# Patient Record
Sex: FEMALE | Race: WHITE | NOT HISPANIC OR LATINO | Employment: OTHER | URBAN - METROPOLITAN AREA
[De-identification: names, ages, dates, MRNs, and addresses within clinical notes are randomized per-mention and may not be internally consistent; named-entity substitution may affect disease eponyms.]

---

## 2023-06-18 LAB — URINE CULTURE: NORMAL

## 2023-08-14 ENCOUNTER — HOSPITAL ENCOUNTER (OUTPATIENT)
Dept: DATA CONVERSION | Facility: HOSPITAL | Age: 68
Discharge: HOME | End: 2023-08-14

## 2023-08-14 DIAGNOSIS — N81.4 UTEROVAGINAL PROLAPSE, UNSPECIFIED: ICD-10-CM

## 2023-08-14 DIAGNOSIS — N39.3 STRESS INCONTINENCE (FEMALE) (MALE): ICD-10-CM

## 2023-08-14 DIAGNOSIS — N80.03 ADENOMYOSIS OF THE UTERUS: ICD-10-CM

## 2023-08-14 DIAGNOSIS — N81.3 COMPLETE UTEROVAGINAL PROLAPSE: ICD-10-CM

## 2023-08-14 LAB — ABO + RH BLD: NORMAL

## 2023-11-17 ENCOUNTER — OFFICE VISIT (OUTPATIENT)
Dept: OBSTETRICS AND GYNECOLOGY | Facility: CLINIC | Age: 68
End: 2023-11-17
Payer: MEDICARE

## 2023-11-17 VITALS
DIASTOLIC BLOOD PRESSURE: 83 MMHG | SYSTOLIC BLOOD PRESSURE: 152 MMHG | HEIGHT: 64 IN | BODY MASS INDEX: 23.12 KG/M2 | HEART RATE: 118 BPM | WEIGHT: 135.4 LBS

## 2023-11-17 DIAGNOSIS — R33.9 URINARY RETENTION: ICD-10-CM

## 2023-11-17 DIAGNOSIS — N39.3 SUI (STRESS URINARY INCONTINENCE, FEMALE): Primary | ICD-10-CM

## 2023-11-17 DIAGNOSIS — R39.9 UTI SYMPTOMS: ICD-10-CM

## 2023-11-17 LAB
POC APPEARANCE, URINE: CLEAR
POC BILIRUBIN, URINE: NEGATIVE
POC BLOOD, URINE: ABNORMAL
POC COLOR, URINE: YELLOW
POC GLUCOSE, URINE: NEGATIVE MG/DL
POC KETONES, URINE: NEGATIVE MG/DL
POC LEUKOCYTES, URINE: ABNORMAL
POC NITRITE,URINE: NEGATIVE
POC PH, URINE: 6 PH
POC PROTEIN, URINE: NEGATIVE MG/DL
POC SPECIFIC GRAVITY, URINE: 1.01
POC UROBILINOGEN, URINE: 0.2 EU/DL

## 2023-11-17 PROCEDURE — 81003 URINALYSIS AUTO W/O SCOPE: CPT | Performed by: OBSTETRICS & GYNECOLOGY

## 2023-11-17 PROCEDURE — 51798 US URINE CAPACITY MEASURE: CPT | Mod: PN | Performed by: OBSTETRICS & GYNECOLOGY

## 2023-11-17 PROCEDURE — 99214 OFFICE O/P EST MOD 30 MIN: CPT | Mod: PO | Performed by: OBSTETRICS & GYNECOLOGY

## 2023-11-17 PROCEDURE — 1036F TOBACCO NON-USER: CPT | Performed by: OBSTETRICS & GYNECOLOGY

## 2023-11-17 PROCEDURE — 1126F AMNT PAIN NOTED NONE PRSNT: CPT | Performed by: OBSTETRICS & GYNECOLOGY

## 2023-11-17 PROCEDURE — 51798 US URINE CAPACITY MEASURE: CPT | Performed by: OBSTETRICS & GYNECOLOGY

## 2023-11-17 PROCEDURE — 99214 OFFICE O/P EST MOD 30 MIN: CPT | Performed by: OBSTETRICS & GYNECOLOGY

## 2023-11-17 ASSESSMENT — ENCOUNTER SYMPTOMS
CONSTITUTIONAL NEGATIVE: 1
NEUROLOGICAL NEGATIVE: 1
EYES NEGATIVE: 1
CARDIOVASCULAR NEGATIVE: 1
ENDOCRINE NEGATIVE: 1
PSYCHIATRIC NEGATIVE: 1
MUSCULOSKELETAL NEGATIVE: 1
RESPIRATORY NEGATIVE: 1
GASTROINTESTINAL NEGATIVE: 1

## 2023-11-17 ASSESSMENT — PAIN SCALES - GENERAL: PAINLEVEL: 0-NO PAIN

## 2023-11-17 NOTE — PROGRESS NOTES
[facility] Department of Urogynecology   Safia Lynch MD, MPH   [phone]    ASSESSMENT AND PLAN:   68 year old female presenting in postop s/p SSLS, APR, TVH, midurethral sling and cystoscopy on 8/14/2023 @ [facility] for a stage 3 uterovaginal prolapse with predominant cystocele. Endorsing occult SUNITA since she declined TVT at the time of her POP repair and opted for a staged procedure if needed. Not a good candidate for TVT as she valsalva voids at baseline and has urinary retention on UDS and per her PVR in clinic.      Diagnoses:   #1 Stress urinary incontinence   #2 Uterovaginal prolapse with predominant cystocele, stage 3  #3 Urinary retention      Plan:   1. Uterovaginal prolapse, postop  - 8/14/2023 surgery: SSLS, KS, TVH, and cystoscopy.   - Resolved POP and she is well-healed from surgery.   - Discussed that it can take 3-6 months to dissolve and fall out. We described the material of the suture to expect what it will look and feel like when it falls out so she will not be alarmed by this as it is completely normal and to be expected.     2. SUNITA, non-obstructive urinary retention  - SUNITA not bothersome  - PVR = 360mL by bladder U/S. She states she was nervous today and may not have completely emptied do to that  - Reviewed her prior UDS from 8/14/2023 which demonstrated normal capacity and compliance, normal sensation. + SUNITA at 450, no DO on this exam. MUCP 39. Pressure-flow: unable to void with catheter in place- expelled during attempt to void. Could only void 17 cc and straight cath PVR was 550. no evidence of MAYFIELD, but unable to empty, with slow flow.   - Patient is not a good candidate for TVT as she valsalva voids at baseline and has urinary retention on UDS.   - She continues to endorse mild SUNITA with running > coughing/laughing; goes through around 1 pad per day for this. - The patient does not wish to pursue any kind of SUNITA treatment at this time as she is not severely bothered by rare  SUNITA and will plan to continue wearing pads when running for SUNITA related leakage given that any third-line options (TVT vs. Bulkamid) put her at greater risk for developing worsening urinary retention.   - No recent UTI and she ultimately denies feeling of incomplete bladder emptying at baseline; she states she feels nervous when she is in the clinic and this could be affecting her ability to void completely.   - If she continues to have persistent retention issues we discussed that this can negatively impact her kidney function and we may consider discussing ISC or SNM to address this. Plan to check her kidney function annually with BMP (creatinine) labs due to her having non-obstructive urinary retention.   - Cr 0.6 in August 2023    3. Vaginal atrophy, history of UTI  - Advised her to call our office and leave a urine sample at any  lab when she feels symptomatic of a UTI in the future. Placed standing urine culture orders for this.   - Discussed that utilizing tv estrogen acts as UTI prophylaxis in postmenopausal women by making the vaginal pH more acidic by therefore preventing bacteria from colonizing.   - Encouraged her to restart using tv Estradiol cream 2x/week for UTI ppx.     Follow up in 9 months with Dr. Safia Lynch for a yearly pelvic exam and PVR check.     Scribe Attestation  By signing my name below, I, Dell Carmichael, Scribe, attest that this documentation has been prepared under the direction and in the presence of Safia Lynch MD MPH.    Problem List Items Addressed This Visit    None  Visit Diagnoses       UTI symptoms    -  Primary    Relevant Orders    Urinalysis with Reflex Microscopic and Culture    SUNITA (stress urinary incontinence, female)        Relevant Orders    POCT UA Automated manually resulted    Measure post void residual           I spent a total of 20 minutes in face to face and non face to face time.      Safia Lynch MD, MPH, FACOG     I Dr. Lynch, personally performed  the services described in the documentation as scribed in my presence and confirm it is both complete and accurate.  Safia Lynch MD, MPH, FACOG      Established    HISTORY OF PRESENT ILLNESS:   67yo female presenting in follow up for persistent SUNITA.     Records Review:   - 9/22/2023 Dr. Lynch note reviewed: Postoperative visit s/p SSLS, OH, and TVH on 8/14/2023 for uterovaginal prolapse - she had POUR and passed her voiding trial POD #2. Benign surgical pathology. She is healing well and released from lifting restrictions/pelvic rest with resolved POP. Occult SUNITA - declined doing TVT at the time of her POP repair and endorses occult SUNITA. Declines being fitted with anti-incontinnece pessary and she plans to wear pads when she is physically active and may leak with SUNITA while she is away in [place]. She is not a good candidate for TVT because she valsalva voids at baseline. Gave her PI handout on Bulkamid to review and consider.   - 9/1/2023 Reviewed Dr. Lynch's note: Postoperative visit - Her postoperative recovery was complicated by urinary retention, but passed void trial on postop day 1. No POP on exam. SUNITA - +CST upon standing. Patient opted for a staged SUNITA procedure if needed (declined TVT at the time of her POP repair). Planning a trip to [place] on 10/2/2023 and would like to try a pessary before that. Gh = 3.   - 8/14/2023 surgery: SSLS, OH, TVH, and cystoscopy for uterovaginal prolapse.  - UDS 7/19/2023   CMG: normal capacity and compliance, normal sensation. + SUNITA at 450, no DO on this exam. MUCP 39  pressure-flow: unable to void with catheter in place- expelled during attempt to void. Could only void 17 cc and straight cath PVR was 550. no evidence of MAYFIELD, but unable to empty, with slow flow  a/p: 7 yo with uterovaginal prolapse planning TVH, SSLS, APR and cysto in August 2023. sx of MARIAN at baseline with mildly elevated PVR in clinic of 161  > elevated PVR on UDS today. will discuss mechanism of  voiding. given that she couldn't void during UDS, will consider staged procedure with possible re-evaluation and repair of incontinence after prolapse resolved.  > resolving retention may resolve her mixed incontinence and recurrent UTI  > can consider SNM in the future but would proceed with prolapse repair first.     Prolapse Symptoms:   - Resolved POP since her surgery.   - Not currently sexually active.   - She discontinued using tv estrogen cream.      Urinary Symptoms:   - She continues to endorse SUNITA with running, coughing, and laughing.   - The patient is not very bothered by SUNITA and wears around 1 pad per day.   - She does not wish to pursue any kind of SUNITA treatment at this time as she is not severely bothered by rare SUNITA and will plan to continue wearing pads when running for SUNITA related leakage given that any third-line options (TVT vs. Bulkamid) put her at greater risk for developing worsening urinary retention.   - Denies feeling of incomplete bladder emptying at baseline; she states she feels nervous when she is in the clinic and this could be affecting her ability to void completely.   - No recent UTI and does not feel symptomatic of a UTI today.      Past Surgical History:     Past Surgical History:   Procedure Laterality Date    HYSTERECTOMY  08/14/2023    SSLS, Posterior repair         Medications:     Prior to Admission medications    Medication Sig Start Date End Date Taking? Authorizing Provider   ibuprofen 600 mg tablet TAKE 1 TABLET BY MOUTH EVERY 6 HOURS AS NEEDED FOR PAIN 8/14/23 8/13/24  Safia Lynch MD MPH   oxyCODONE (Roxicodone) 5 mg immediate release tablet TAKE 1 TABLET BY MOUTH EVERY 6 HOURS AS NEEDED FOR PAIN 8/14/23 2/10/24  Safia Lynch MD MPH   polyethylene glycol (Glycolax, Miralax) 17 gram/dose powder MIX 17 GRAMS INTO 4-8 OUNCES OF WATER OR JUICE AND TAKE BY MOUTH TWO TIMES A DAY 8/14/23 8/13/24  Safia Lynch MD MPH        ROS  Review of Systems   Constitutional:  "Negative.    HENT: Negative.     Eyes: Negative.    Respiratory: Negative.     Cardiovascular: Negative.    Gastrointestinal: Negative.    Endocrine: Negative.    Genitourinary:  Positive for enuresis.   Musculoskeletal: Negative.    Neurological: Negative.    Psychiatric/Behavioral: Negative.          PHYSICAL EXAM:    /83 (BP Location: Left arm, Patient Position: Sitting)   Pulse (!) 118   Ht 1.626 m (5' 4\")   Wt 61.4 kg (135 lb 6.4 oz)   BMI 23.24 kg/m²     PVR = >300mL by bladder U/S.     Declines chaperone for physical exam.    Well developed, well nourished, in no apparent distress.   Neurologic/Psychiatric:  Awake, Alert and Oriented times 3.  Affect normal.     GENITAL/URINARY:     External Genitalia:  The patient has normal appearing external genitalia, normal skenes and bartholins glands, and a normal hair distribution.  Her vulva is without lesions, erythema or discharge.  It is non-tender with appropriate sensation.     Urethral Meatus:  Size normal, Location normal, Lesions absent, Prolapse absent,      Urethra:  Fullness absent, Masses absent,      Bladder:  Fullness absent, Masses absent, Tenderness absent,      Vagina:  General appearance normal, Estrogen effect normal, Discharge absent, Lesions absent. Sutures are intact at the sacrospinous ligament. Cuff is intact.     Cervix: surgically absent.   Uterus:  surgically absent    Anus/Perineum: Normal Perineum    Stress urinary incontinence not demonstrable.       Physical Exam  Genitourinary:   POP-Q measurements were:      Aa: -2, Ba: -2, C: -9     gH: 4, pB: 7, TVL: 9     Ap: -2, Bp: -2, D: X     Pelvic exam was performed with patient in the lithotomy position.       She does not have myofascial tenderness on exam.     Rectal exam: Deferred.       Data and DIAGNOSTIC STUDIES REVIEWED   No results found.   Lab Results   Component Value Date    URINECULTURE NO SIGNIFICANT GROWTH. 06/16/2023      Lab Results   Component Value Date    GLUCOSE " 110 (H) 08/07/2023    CALCIUM 9.4 08/07/2023     08/07/2023    K 4.9 08/07/2023    CO2 25 08/07/2023    CL 99 08/07/2023    BUN 11 08/07/2023    CREATININE 0.6 08/07/2023     Lab Results   Component Value Date    WBC 5.9 08/07/2023    HGB 14.6 08/07/2023    HCT 44.6 (H) 08/07/2023    MCV 97.2 08/07/2023     08/07/2023

## 2023-12-15 ENCOUNTER — APPOINTMENT (OUTPATIENT)
Dept: OBSTETRICS AND GYNECOLOGY | Facility: CLINIC | Age: 68
End: 2023-12-15
Payer: MEDICARE

## 2024-07-26 ENCOUNTER — APPOINTMENT (OUTPATIENT)
Dept: OBSTETRICS AND GYNECOLOGY | Facility: CLINIC | Age: 69
End: 2024-07-26
Payer: MEDICARE

## 2024-08-16 ENCOUNTER — APPOINTMENT (OUTPATIENT)
Dept: OBSTETRICS AND GYNECOLOGY | Facility: CLINIC | Age: 69
End: 2024-08-16
Payer: MEDICARE

## 2024-08-22 ENCOUNTER — APPOINTMENT (OUTPATIENT)
Dept: OBSTETRICS AND GYNECOLOGY | Facility: CLINIC | Age: 69
End: 2024-08-22
Payer: MEDICARE

## 2024-09-11 ENCOUNTER — APPOINTMENT (OUTPATIENT)
Dept: OBSTETRICS AND GYNECOLOGY | Facility: CLINIC | Age: 69
End: 2024-09-11
Payer: MEDICARE

## 2024-10-18 ENCOUNTER — APPOINTMENT (OUTPATIENT)
Dept: OBSTETRICS AND GYNECOLOGY | Facility: CLINIC | Age: 69
End: 2024-10-18
Payer: MEDICARE

## 2024-11-13 ENCOUNTER — APPOINTMENT (OUTPATIENT)
Dept: OBSTETRICS AND GYNECOLOGY | Facility: CLINIC | Age: 69
End: 2024-11-13
Payer: MEDICARE

## 2024-11-13 VITALS
WEIGHT: 139.8 LBS | SYSTOLIC BLOOD PRESSURE: 132 MMHG | BODY MASS INDEX: 24 KG/M2 | DIASTOLIC BLOOD PRESSURE: 80 MMHG | HEART RATE: 76 BPM

## 2024-11-13 DIAGNOSIS — Z90.710 HISTORY OF HYSTERECTOMY: ICD-10-CM

## 2024-11-13 DIAGNOSIS — N39.3 SUI (STRESS URINARY INCONTINENCE, FEMALE): ICD-10-CM

## 2024-11-13 DIAGNOSIS — R33.9 URINARY RETENTION: Primary | ICD-10-CM

## 2024-11-13 PROCEDURE — 99214 OFFICE O/P EST MOD 30 MIN: CPT | Performed by: OBSTETRICS & GYNECOLOGY

## 2024-11-13 PROCEDURE — 1159F MED LIST DOCD IN RCRD: CPT | Performed by: OBSTETRICS & GYNECOLOGY

## 2024-11-13 RX ORDER — MINERAL OIL
180 ENEMA (ML) RECTAL
COMMUNITY

## 2024-11-13 ASSESSMENT — ENCOUNTER SYMPTOMS
EYES NEGATIVE: 1
CONSTITUTIONAL NEGATIVE: 1
NEUROLOGICAL NEGATIVE: 1
CARDIOVASCULAR NEGATIVE: 1
ENDOCRINE NEGATIVE: 1
RESPIRATORY NEGATIVE: 1
MUSCULOSKELETAL NEGATIVE: 1
GASTROINTESTINAL NEGATIVE: 1
PSYCHIATRIC NEGATIVE: 1

## 2024-11-13 NOTE — PROGRESS NOTES
[facility] Department of Urogynecology   Safia Lynch MD, MPH   [phone]    ASSESSMENT AND PLAN:   69 year old female with a hx of urinary retention. She is s/p SSLS, APR, TVH, midurethral sling and cystoscopy on 8/14/2023 for a stage 3 uterovaginal prolapse with predominant cystocele. Endorsing occult SUNITA since she declined TVT at the time of her POP repair and opted for a staged procedure if needed. Of note, she is not a good candidate for TVT as she valsalva voids at baseline and has urinary retention on UDS and per her PVR in clinic.      Diagnoses:   #1 Stress urinary incontinence (resolved)  #2 Uterovaginal prolapse with predominant cystocele, stage 3 (resolved)   #3 Urinary retention      Plan:   1. Uterovaginal prolapse  - 8/14/2023 surgery: SSLS, APR, TVH, and cystoscopy.   - No evidence of recurrent POP per patient symptoms or based on exam findings.     2. Urinary retention  - Patient states that her urinary retention that we notice in clinic with elevated PVR is solely due to her nervousness. She reports is able to empty her bladder normally at home without the feeling of incomplete bladder emptying or sensation of urinary retention.   - Sent order for labs to include BMP to evaluate her kidney function.   - RTC 1 year or sooner if symptoms of retention worsen    3. SUNITA  - Her SUNITA resolved around x6 months after her POP repair surgery in 2023. She is now able to walk and jog without experiencing SUNITA leakage.     Follow up in 1 year with Dr. Safia Lynch to get a PVR and annual BMP to ensure her kidney function is normal due to having urinary retention issues.     Scribe Attestation  By signing my name below, Dell PEMBERTON Scribe, attest that this documentation has been prepared under the direction and in the presence of Safia Lynch MD MPH on 11/13/2024 at 12:23 PM.     Problem List Items Addressed This Visit    None  Visit Diagnoses       Urinary retention    -  Primary     Relevant Orders    Basic metabolic panel    History of hysterectomy               I spent a total of 30 minutes in face to face and non face to face time.     I Dr. Lynch, personally performed the services described in the documentation as scribed in my presence and confirm it is both complete and accurate.  Safai Lynch MD, MPH, FACOG       Safia Lynch MD, MPH, FACOG     Established    HISTORY OF PRESENT ILLNESS:   69 year old female presenting for an annual postoperative follow up after POP repair in 2023 and for SUNITA.     Record Review:   -11/17/2023 Dr. Safia Lynch note RE: Hx of uterovaginal prolapse - Postoperative visit s/p SSLS, SD, and TVH on 8/14/2023 with resolved preoperative POP symptoms. SUNITA - Endorsing occult SUNITA but is not bothersome as she declined a TVT at the time of her POP repair and opted for a staged procedure if needed. She was not a good candidate for TVT as she valsalva voids at baseline and has urinary retention on UDS. Urinary retention - PVR = 360mL. No recent UTI. We discussed that if she has persistent retention that could negatively impact her kidney function. Her last Cr was 0.6 on 8/7/2023.   - 9/22/2023 Dr. Lynch note reviewed: Postoperative visit s/p SSLS, SD, and TVH on 8/14/2023 for uterovaginal prolapse - she had POUR and passed her voiding trial POD #2. Benign surgical pathology. She is healing well and released from lifting restrictions/pelvic rest with resolved POP. Occult SUNITA - declined doing TVT at the time of her POP repair and endorses occult SUNITA. Declines being fitted with anti-incontinnece pessary and she plans to wear pads when she is physically active and may leak with SUNITA while she is away in [place]. She is not a good candidate for TVT because she valsalva voids at baseline. Gave her PI handout on Bulkamid to review and consider.   - 9/1/2023 Reviewed Dr. Lynch's note: Postoperative visit - Her postoperative recovery was complicated by urinary  retention, but passed void trial on postop day 1. No POP on exam. SUNITA - +CST upon standing. Patient opted for a staged SUNITA procedure if needed (declined TVT at the time of her POP repair). Planning a trip to [place] on 10/2/2023 and would like to try a pessary before that. Gh = 3.   - 8/14/2023 surgery: SSLS, APR, TVH, and cystoscopy for uterovaginal prolapse.  - UDS 7/19/2023   CMG: normal capacity and compliance, normal sensation. + SUNITA at 450, no DO on this exam. MUCP 39  pressure-flow: unable to void with catheter in place- expelled during attempt to void. Could only void 17 cc and straight cath PVR was 550. no evidence of MAYFIELD, but unable to empty, with slow flow  A/P: 69 yo with uterovaginal prolapse planning TVH, SSLS, APR and cysto in August 2023. sx of MARIAN at baseline with mildly elevated PVR in clinic of 161  > elevated PVR on UDS today. will discuss mechanism of voiding. given that she couldn't void during UDS, will consider staged procedure with possible re-evaluation and repair of incontinence after prolapse resolved.  > Resolving retention may resolve her mixed incontinence and recurrent UTI  > Can consider SNM in the future but would proceed with prolapse repair first.     Prolapse Symptoms:  - Denies any feeling of a vaginal bulge or POP recurrence.    - She is not currently sexually active.     Urinary Symptoms:   - She reports a complete resolution of her SUNITA and is very pleased with this.   - SUNITA resolved around 6 months after her POP repair surgery and she is now able to walk and jog without experiencing SUNITA leakage.   - Patient states that her urinary retention we find in clinic is solely due to nervousness. She is able to empty her bladder normally at home without the feeling of incomplete bladder emptying or sensation of urinary retention.   - Denies any recent UTI within the past year.   - She ran out of E2 cream around 6 months ago and does not feel the need to continue using tv estrogen cream.  Patient has not noticed any difference in her vaginal tissue or experiencing dryness since stopping E2 cream.        Past Medical History:     No past medical history on file.       Past Surgical History:     Past Surgical History:   Procedure Laterality Date    HYSTERECTOMY  08/14/2023    SSLS, Posterior repair         Medications:     Prior to Admission medications    Medication Sig Start Date End Date Taking? Authorizing Provider   CALCIUM CITRATE-VITAMIN D3 ORAL Take 1 tablet by mouth 1 time. 1/20/23  Yes Historical Provider, MD   fexofenadine (Allegra) 180 mg tablet Take 1 tablet (180 mg) by mouth.   Yes Historical Provider, MD   multivitamin with minerals iron-free (Centrum Silver) Take 1 tablet by mouth once daily.   Yes Historical Provider, MD GALLEGOS  Review of Systems   Constitutional: Negative.    HENT: Negative.     Eyes: Negative.    Respiratory: Negative.     Cardiovascular: Negative.    Gastrointestinal: Negative.    Endocrine: Negative.    Musculoskeletal: Negative.    Neurological: Negative.    Psychiatric/Behavioral: Negative.            PHYSICAL EXAM:    /80   Pulse 76   Wt 63.4 kg (139 lb 12.8 oz)   BMI 24.00 kg/m²   No LMP recorded. Patient is postmenopausal.    Declines chaperone for physical exam.      Well developed, well nourished, in no apparent distress.   Neurologic/Psychiatric:  Awake, Alert and Oriented times 3.  Affect normal.     GENITAL/URINARY:     External Genitalia:  The patient has normal appearing external genitalia, normal skenes and bartholins glands, and a normal hair distribution.  Her vulva is without lesions, erythema or discharge.  It is non-tender with appropriate sensation.     Urethral Meatus:  Size normal, Location normal, Lesions absent, Prolapse absent.    Urethra:  Fullness absent, Masses absent.    Bladder:  Fullness absent, Masses absent, Tenderness absent.    Vagina:  General appearance normal, Discharge absent, Lesions absent. Normal vaginal  epithelium, hypoestrogenic.     Cervix: surgically absent  Uterus:  surgically absent    Anus/Perineum:  Lesions absent and masses absent. Normal anus and perineum is intact.      Stress urinary incontinence not demonstrable.       Physical Exam  Genitourinary:      Urethral meatus normal.      Vaginal cuff intact.     No vaginal prolapse present.     Mild vaginal atrophy present.       Right Adnexa: not tender and no mass present.     Left Adnexa: not tender.     Cervix is absent.      Uterus is absent.      No urethral tenderness, mass or stress urinary incontinence with cough stress test present.      Levator ani not tender.  POP-Q measurements were:      Aa: -2.5, Ba: -2.5, C: -9     gH: 2, pB: 6, TVL: 9     Ap: -2.5, Bp: -2.5, D: X     Pelvic exam was performed with patient in the lithotomy position.         She does not have myofascial tenderness on exam. No pain with palpation over the bilateral levators.   Her highest pain score on exam is 1.       Rectal exam: Deferred.       Data and DIAGNOSTIC STUDIES REVIEWED   No results found.   Lab Results   Component Value Date    URINECULTURE NO SIGNIFICANT GROWTH. 06/16/2023      Lab Results   Component Value Date    GLUCOSE 110 (H) 08/07/2023    CALCIUM 9.4 08/07/2023     08/07/2023    K 4.9 08/07/2023    CO2 25 08/07/2023    CL 99 08/07/2023    BUN 11 08/07/2023    CREATININE 0.6 08/07/2023     Lab Results   Component Value Date    WBC 5.9 08/07/2023    HGB 14.6 08/07/2023    HCT 44.6 (H) 08/07/2023    MCV 97.2 08/07/2023     08/07/2023

## 2024-11-15 ENCOUNTER — LAB (OUTPATIENT)
Dept: LAB | Facility: LAB | Age: 69
End: 2024-11-15
Payer: MEDICARE

## 2024-11-15 DIAGNOSIS — R33.9 URINARY RETENTION: ICD-10-CM

## 2024-11-15 LAB
ANION GAP SERPL CALC-SCNC: 10 MMOL/L (ref 10–20)
BUN SERPL-MCNC: 15 MG/DL (ref 6–23)
CALCIUM SERPL-MCNC: 9.6 MG/DL (ref 8.6–10.6)
CHLORIDE SERPL-SCNC: 102 MMOL/L (ref 98–107)
CO2 SERPL-SCNC: 30 MMOL/L (ref 21–32)
CREAT SERPL-MCNC: 0.68 MG/DL (ref 0.5–1.05)
EGFRCR SERPLBLD CKD-EPI 2021: >90 ML/MIN/1.73M*2
GLUCOSE SERPL-MCNC: 124 MG/DL (ref 74–99)
POTASSIUM SERPL-SCNC: 4.9 MMOL/L (ref 3.5–5.3)
SODIUM SERPL-SCNC: 137 MMOL/L (ref 136–145)

## 2024-11-15 PROCEDURE — 36415 COLL VENOUS BLD VENIPUNCTURE: CPT

## 2024-11-15 PROCEDURE — 80048 BASIC METABOLIC PNL TOTAL CA: CPT
